# Patient Record
Sex: MALE | URBAN - METROPOLITAN AREA
[De-identification: names, ages, dates, MRNs, and addresses within clinical notes are randomized per-mention and may not be internally consistent; named-entity substitution may affect disease eponyms.]

---

## 2019-05-11 ENCOUNTER — HOSPITAL ENCOUNTER (OUTPATIENT)
Dept: TELEMEDICINE | Facility: HOSPITAL | Age: 74
Discharge: HOME | End: 2019-05-11
Payer: MEDICARE

## 2019-05-11 DIAGNOSIS — G45.4 TGA (TRANSIENT GLOBAL AMNESIA): ICD-10-CM

## 2019-05-11 PROCEDURE — G0425 INPT/ED TELECONSULT30: HCPCS | Mod: GT,,, | Performed by: PSYCHIATRY & NEUROLOGY

## 2019-05-11 PROCEDURE — G0425 PR INPT TELEHEALTH CONSULT 30M: ICD-10-PCS | Mod: GT,,, | Performed by: PSYCHIATRY & NEUROLOGY

## 2019-05-12 NOTE — CONSULTS
[facility]  Vascular Neurology  Comprehensive Stroke Center  Tele-Consultation Note      Consults    Consulting Provider: JARROD GROVES III  Current Providers  No providers found    Patient Location: [facility] - TELEMEDICINE ED [facility] Emergency Department  Spoke hospital nurse at bedside with patient assisting consultant.     Patient information was obtained from patient and spouse/SO.         Assessment/Plan:     STROKE DOCUMENTATION     Acute Stroke Times:   Acute Stroke Times   Last Known Normal Date: 05/11/19  Last Known Normal Time: 1800  Stroke Team Arrival Date: 05/11/19  Stroke Team Arrival Time: 2004  CT Interpretation Time: 2009    NIH Scale:  1a. Level of Consciousness: 0-->Alert, keenly responsive  1b. LOC Questions: 0-->Answers both questions correctly  1c. LOC Commands: 0-->Performs both tasks correctly  2. Best Gaze: 0-->Normal  3. Visual: 0-->No visual loss  4. Facial Palsy: 0-->Normal symmetrical movements  5a. Motor Arm, Left: 0-->No drift, limb holds 90 (or 45) degrees for full 10 secs  5b. Motor Arm, Right: 0-->No drift, limb holds 90 (or 45) degrees for full 10 secs  6a. Motor Leg, Left: 0-->No drift, leg holds 30 degree position for full 5 secs  6b. Motor Leg, Right: 0-->No drift, leg holds 30 degree position for full 5 secs  7. Limb Ataxia: 0-->Absent  8. Sensory: 0-->Normal, no sensory loss  9. Best Language: 0-->No aphasia, normal  10. Dysarthria: 0-->Normal  11. Extinction and Inattention (formerly Neglect): 0-->No abnormality  Total (NIH Stroke Scale): 0     Modified [place] Coma Scale:    ABCD2 Score:    [id number] Score:   HAS -BLED Score:   ICH Score:   Hunt & Galloway Classification:       Diagnoses:   TGA (transient global amnesia)  SEE HPI    Patient with transient global amnesia now resolved    Please check MRI brain, MRA brain, and EEG  If stable overnight and testing normal may be discharged.         There  were no vitals taken for this visit.  Alteplase Eligible?: No  Alteplase Recommendation: Alteplase not recommended due to Suspected stroke mimic  and Symptoms resolved   Possible Interventional Revascularization Candidate? No; No significant neurological deficit    Disposition Recommendation: admit to inpatient    Subjective:     History of Present Illness:  73 year old man presents with about 1 hour of transient global amnesia now resolved       Woke up with symptoms?: no    Recent bleeding noted: no  Does the patient take any Blood Thinners? yes  Medications: Antiplatelets:  aspirin and clopidogrel/Plavix      Past Medical History: hypertension, diabetes, hyperlipidemia, MI/CAD, Heart Problems and Cancer    Past Surgical History: none    Family History: MI/CAD, stroke and Cancer    Social History: no smoking, no drinking, no drugs    Allergies: Allergies have not been reviewed No relevant allergies    Review of Systems   Psychiatric/Behavioral: Positive for confusion.   All other systems reviewed and are negative.    Objective:       CT READ: Yes  No hemmorhage. No mass effect. No early infarct signs.     Physical Exam   Constitutional: He appears well-developed.   HENT:   Head: Normocephalic.   Eyes: Pupils are equal, round, and reactive to light.   Neck: Normal range of motion.   Cardiovascular: Normal rate.   Pulmonary/Chest: Effort normal.   Abdominal: Soft.   Neurological: He is alert.             Recommended the emergency room physician to have a brief discussion with the patient and/or family if available regarding the risks and benefits of treatment, and to briefly document the occurrence of that discussion in his clinical encounter note.     The attending portion of this evaluation, treatment, and documentation was performed per Blas Naylor MD via audiovisual.    Billing code:  (non-intervention mild to moderate stroke, TIA, some mimics)    · This patient has a critical neurological  condition/illness, with some potential for high morbidity and mortality.  · There is a moderate probability for acute neurological change leading to clinical and possibly life-threatening deterioration requiring highest level of physician preparedness for urgent intervention.  · Care was coordinated with other physicians involved in the patient's care.  · Radiologic studies and laboratory data were reviewed and interpreted, and plan of care was re-assessed based on the results.  · Diagnosis, treatment options and prognosis may have been discussed with the patient and/or family members or caregiver.      In your opinion, this was a: Tier 2 Van Negative    Consult End Time: 8:23 PM     Blas Naylor MD  Comprehensive Stroke Center  Vascular Neurology   [facility]

## 2019-05-12 NOTE — SUBJECTIVE & OBJECTIVE
Woke up with symptoms?: no    Recent bleeding noted: no  Does the patient take any Blood Thinners? yes  Medications: Antiplatelets:  aspirin and clopidogrel/Plavix      Past Medical History: hypertension, diabetes, hyperlipidemia, MI/CAD, Heart Problems and Cancer    Past Surgical History: none    Family History: MI/CAD, stroke and Cancer    Social History: no smoking, no drinking, no drugs    Allergies: Allergies have not been reviewed No relevant allergies    Review of Systems   Psychiatric/Behavioral: Positive for confusion.   All other systems reviewed and are negative.    Objective:       CT READ: Yes  No hemmorhage. No mass effect. No early infarct signs.     Physical Exam   Constitutional: He appears well-developed.   HENT:   Head: Normocephalic.   Eyes: Pupils are equal, round, and reactive to light.   Neck: Normal range of motion.   Cardiovascular: Normal rate.   Pulmonary/Chest: Effort normal.   Abdominal: Soft.   Neurological: He is alert.

## 2019-05-12 NOTE — ASSESSMENT & PLAN NOTE
SEE HPI    Patient with transient global amnesia now resolved    Please check MRI brain, MRA brain, and EEG  If stable overnight and testing normal may be discharged.